# Patient Record
Sex: FEMALE | URBAN - METROPOLITAN AREA
[De-identification: names, ages, dates, MRNs, and addresses within clinical notes are randomized per-mention and may not be internally consistent; named-entity substitution may affect disease eponyms.]

---

## 2022-01-24 ENCOUNTER — APPOINTMENT (RX ONLY)
Dept: URBAN - METROPOLITAN AREA CLINIC 48 | Facility: CLINIC | Age: 52
Setting detail: DERMATOLOGY
End: 2022-01-24

## 2022-01-24 DIAGNOSIS — L81.4 OTHER MELANIN HYPERPIGMENTATION: ICD-10-CM

## 2022-01-24 DIAGNOSIS — L65.9 NONSCARRING HAIR LOSS, UNSPECIFIED: ICD-10-CM | Status: WORSENING

## 2022-01-24 DIAGNOSIS — D22 MELANOCYTIC NEVI: ICD-10-CM

## 2022-01-24 PROBLEM — D22.5 MELANOCYTIC NEVI OF TRUNK: Status: ACTIVE | Noted: 2022-01-24

## 2022-01-24 PROCEDURE — ? PRESCRIPTION MEDICATION MANAGEMENT

## 2022-01-24 PROCEDURE — ? COUNSELING

## 2022-01-24 PROCEDURE — ? PRESCRIPTION

## 2022-01-24 PROCEDURE — 99204 OFFICE O/P NEW MOD 45 MIN: CPT

## 2022-01-24 PROCEDURE — ? DEFER

## 2022-01-24 PROCEDURE — ? SUNSCREEN TREATMENT REGIMEN

## 2022-01-24 RX ORDER — FLUOCINONIDE 0.5 MG/ML
SOLUTION TOPICAL BID
Qty: 60 | Refills: 0 | Status: ERX | COMMUNITY
Start: 2022-01-24

## 2022-01-24 RX ADMIN — FLUOCINONIDE: 0.5 SOLUTION TOPICAL at 00:00

## 2022-01-24 ASSESSMENT — LOCATION DETAILED DESCRIPTION DERM
LOCATION DETAILED: RIGHT SUPERIOR FOREHEAD
LOCATION DETAILED: LEFT SUPERIOR FOREHEAD
LOCATION DETAILED: POSTERIOR MID-PARIETAL SCALP
LOCATION DETAILED: RIGHT SUPERIOR MEDIAL UPPER BACK
LOCATION DETAILED: LEFT MEDIAL SUPERIOR CHEST

## 2022-01-24 ASSESSMENT — LOCATION ZONE DERM
LOCATION ZONE: FACE
LOCATION ZONE: SCALP
LOCATION ZONE: TRUNK

## 2022-01-24 ASSESSMENT — LOCATION SIMPLE DESCRIPTION DERM
LOCATION SIMPLE: RIGHT UPPER BACK
LOCATION SIMPLE: LEFT FOREHEAD
LOCATION SIMPLE: CHEST
LOCATION SIMPLE: RIGHT FOREHEAD
LOCATION SIMPLE: POSTERIOR SCALP

## 2022-01-24 NOTE — PROCEDURE: PRESCRIPTION MEDICATION MANAGEMENT
Initiate Treatment: Patient is starting treatment of fluocinonide Solution 0.05% and will follow up in 2weeks for a punch biopsy
Render In Strict Bullet Format?: No
Detail Level: Zone

## 2022-01-26 ENCOUNTER — RX ONLY (OUTPATIENT)
Age: 52
Setting detail: RX ONLY
End: 2022-01-26

## 2022-01-26 RX ORDER — FLUOCINONIDE 0.5 MG/ML
SOLUTION TOPICAL BID
Qty: 60 | Refills: 0 | Status: ERX